# Patient Record
Sex: MALE | URBAN - NONMETROPOLITAN AREA
[De-identification: names, ages, dates, MRNs, and addresses within clinical notes are randomized per-mention and may not be internally consistent; named-entity substitution may affect disease eponyms.]

---

## 2022-03-23 ENCOUNTER — APPOINTMENT (RX ONLY)
Dept: URBAN - NONMETROPOLITAN AREA CLINIC 16 | Facility: CLINIC | Age: 67
Setting detail: DERMATOLOGY
End: 2022-03-23

## 2022-03-23 DIAGNOSIS — H00.1 CHALAZION: ICD-10-CM | Status: INADEQUATELY CONTROLLED

## 2022-03-23 PROBLEM — L30.9 DERMATITIS, UNSPECIFIED: Status: ACTIVE | Noted: 2022-03-23

## 2022-03-23 PROCEDURE — ? RETURN TO REFERRING PROVIDER

## 2022-03-23 PROCEDURE — ? ADDITIONAL NOTES

## 2022-03-23 ASSESSMENT — LOCATION DETAILED DESCRIPTION DERM
LOCATION DETAILED: RIGHT LATERAL INFERIOR EYELID
LOCATION DETAILED: RIGHT LATERAL SUPERIOR EYELID

## 2022-03-23 ASSESSMENT — LOCATION ZONE DERM: LOCATION ZONE: EYELID

## 2022-03-23 ASSESSMENT — LOCATION SIMPLE DESCRIPTION DERM
LOCATION SIMPLE: RIGHT INFERIOR EYELID
LOCATION SIMPLE: RIGHT SUPERIOR EYELID

## 2022-03-23 NOTE — PROCEDURE: ADDITIONAL NOTES
Pt back from CT   
Additional Notes: Both areas are too close to the eyelid margins for a biopsy. I do not have an appropriate means for a biopsy in the clinic, particularly in regards to hemostasis following biopsy. I recommend patient be referred to ophthalmology or an ocular surgeon.
Render Risk Assessment In Note?: no
Detail Level: Simple
